# Patient Record
Sex: FEMALE | Race: WHITE | NOT HISPANIC OR LATINO | Employment: PART TIME | URBAN - METROPOLITAN AREA
[De-identification: names, ages, dates, MRNs, and addresses within clinical notes are randomized per-mention and may not be internally consistent; named-entity substitution may affect disease eponyms.]

---

## 2023-06-26 ENCOUNTER — APPOINTMENT (OUTPATIENT)
Dept: PRIMARY CARE | Facility: CLINIC | Age: 38
End: 2023-06-26
Payer: COMMERCIAL

## 2023-08-30 LAB
ALANINE AMINOTRANSFERASE (SGPT) (U/L) IN SER/PLAS: 14 U/L (ref 7–45)
ALBUMIN (G/DL) IN SER/PLAS: 4.3 G/DL (ref 3.4–5)
ALKALINE PHOSPHATASE (U/L) IN SER/PLAS: 92 U/L (ref 33–110)
ANION GAP IN SER/PLAS: 13 MMOL/L (ref 10–20)
ASPARTATE AMINOTRANSFERASE (SGOT) (U/L) IN SER/PLAS: 21 U/L (ref 9–39)
BASOPHILS (10*3/UL) IN BLOOD BY AUTOMATED COUNT: 0.05 X10E9/L (ref 0–0.1)
BASOPHILS/100 LEUKOCYTES IN BLOOD BY AUTOMATED COUNT: 1 % (ref 0–2)
BILIRUBIN TOTAL (MG/DL) IN SER/PLAS: 0.2 MG/DL (ref 0–1.2)
CALCIDIOL (25 OH VITAMIN D3) (NG/ML) IN SER/PLAS: 32 NG/ML
CALCIUM (MG/DL) IN SER/PLAS: 9.2 MG/DL (ref 8.6–10.3)
CARBON DIOXIDE, TOTAL (MMOL/L) IN SER/PLAS: 27 MMOL/L (ref 21–32)
CHLORIDE (MMOL/L) IN SER/PLAS: 104 MMOL/L (ref 98–107)
CHOLESTEROL (MG/DL) IN SER/PLAS: 219 MG/DL (ref 0–199)
CHOLESTEROL IN HDL (MG/DL) IN SER/PLAS: 80.2 MG/DL
CHOLESTEROL/HDL RATIO: 2.7
COBALAMIN (VITAMIN B12) (PG/ML) IN SER/PLAS: 156 PG/ML (ref 211–911)
CREATININE (MG/DL) IN SER/PLAS: 0.74 MG/DL (ref 0.5–1.05)
EOSINOPHILS (10*3/UL) IN BLOOD BY AUTOMATED COUNT: 0.1 X10E9/L (ref 0–0.7)
EOSINOPHILS/100 LEUKOCYTES IN BLOOD BY AUTOMATED COUNT: 2 % (ref 0–6)
ERYTHROCYTE DISTRIBUTION WIDTH (RATIO) BY AUTOMATED COUNT: 13.1 % (ref 11.5–14.5)
ERYTHROCYTE MEAN CORPUSCULAR HEMOGLOBIN CONCENTRATION (G/DL) BY AUTOMATED: 32.8 G/DL (ref 32–36)
ERYTHROCYTE MEAN CORPUSCULAR VOLUME (FL) BY AUTOMATED COUNT: 95 FL (ref 80–100)
ERYTHROCYTES (10*6/UL) IN BLOOD BY AUTOMATED COUNT: 4.35 X10E12/L (ref 4–5.2)
GFR FEMALE: >90 ML/MIN/1.73M2
GLUCOSE (MG/DL) IN SER/PLAS: 67 MG/DL (ref 74–99)
HEMATOCRIT (%) IN BLOOD BY AUTOMATED COUNT: 41.5 % (ref 36–46)
HEMOGLOBIN (G/DL) IN BLOOD: 13.6 G/DL (ref 12–16)
IMMATURE GRANULOCYTES/100 LEUKOCYTES IN BLOOD BY AUTOMATED COUNT: 0.2 % (ref 0–0.9)
LDL: 124 MG/DL (ref 0–99)
LEUKOCYTES (10*3/UL) IN BLOOD BY AUTOMATED COUNT: 5 X10E9/L (ref 4.4–11.3)
LYMPHOCYTES (10*3/UL) IN BLOOD BY AUTOMATED COUNT: 1.08 X10E9/L (ref 1.2–4.8)
LYMPHOCYTES/100 LEUKOCYTES IN BLOOD BY AUTOMATED COUNT: 21.6 % (ref 13–44)
MONOCYTES (10*3/UL) IN BLOOD BY AUTOMATED COUNT: 0.4 X10E9/L (ref 0.1–1)
MONOCYTES/100 LEUKOCYTES IN BLOOD BY AUTOMATED COUNT: 8 % (ref 2–10)
NEUTROPHILS (10*3/UL) IN BLOOD BY AUTOMATED COUNT: 3.35 X10E9/L (ref 1.2–7.7)
NEUTROPHILS/100 LEUKOCYTES IN BLOOD BY AUTOMATED COUNT: 67.2 % (ref 40–80)
PLATELETS (10*3/UL) IN BLOOD AUTOMATED COUNT: 231 X10E9/L (ref 150–450)
POTASSIUM (MMOL/L) IN SER/PLAS: 4.2 MMOL/L (ref 3.5–5.3)
PROTEIN TOTAL: 7.1 G/DL (ref 6.4–8.2)
SODIUM (MMOL/L) IN SER/PLAS: 140 MMOL/L (ref 136–145)
THYROTROPIN (MIU/L) IN SER/PLAS BY DETECTION LIMIT <= 0.05 MIU/L: 2.01 MIU/L (ref 0.44–3.98)
TRIGLYCERIDE (MG/DL) IN SER/PLAS: 76 MG/DL (ref 0–149)
UREA NITROGEN (MG/DL) IN SER/PLAS: 18 MG/DL (ref 6–23)
VLDL: 15 MG/DL (ref 0–40)

## 2023-10-02 ENCOUNTER — APPOINTMENT (OUTPATIENT)
Dept: PHYSICAL THERAPY | Facility: CLINIC | Age: 38
End: 2023-10-02
Payer: COMMERCIAL

## 2023-10-02 ENCOUNTER — DOCUMENTATION (OUTPATIENT)
Dept: PHYSICAL THERAPY | Facility: CLINIC | Age: 38
End: 2023-10-02
Payer: COMMERCIAL

## 2023-10-04 PROBLEM — R13.10 DYSPHAGIA: Status: ACTIVE | Noted: 2023-10-04

## 2023-10-04 PROBLEM — B36.0 TINEA VERSICOLOR: Status: ACTIVE | Noted: 2023-10-04

## 2023-10-04 PROBLEM — G56.00 CARPAL TUNNEL SYNDROME: Status: ACTIVE | Noted: 2023-10-04

## 2023-10-04 PROBLEM — E55.9 VITAMIN D DEFICIENCY: Status: ACTIVE | Noted: 2023-10-04

## 2023-10-04 PROBLEM — I83.893 VARICOSE VEINS OF BILATERAL LOWER EXTREMITIES WITH OTHER COMPLICATIONS: Status: ACTIVE | Noted: 2023-10-04

## 2023-10-04 PROBLEM — M62.81 MUSCULAR WEAKNESS: Status: ACTIVE | Noted: 2023-10-04

## 2023-10-04 PROBLEM — H60.90 OTITIS EXTERNA: Status: ACTIVE | Noted: 2023-10-04

## 2023-10-04 PROBLEM — F41.8 DEPRESSION WITH ANXIETY: Status: ACTIVE | Noted: 2023-10-04

## 2023-10-04 PROBLEM — R22.0 SCALP MASS: Status: ACTIVE | Noted: 2023-10-04

## 2023-10-04 PROBLEM — G47.00 INSOMNIA: Status: ACTIVE | Noted: 2023-10-04

## 2023-10-04 RX ORDER — OMEPRAZOLE 40 MG/1
1 CAPSULE, DELAYED RELEASE ORAL 2 TIMES DAILY
COMMUNITY
Start: 2022-06-20

## 2023-10-04 RX ORDER — LANOLIN ALCOHOL/MO/W.PET/CERES
1000 CREAM (GRAM) TOPICAL DAILY
COMMUNITY
Start: 2023-08-31 | End: 2024-03-07 | Stop reason: SDUPTHER

## 2023-10-04 RX ORDER — IBUPROFEN 600 MG/1
600 TABLET ORAL EVERY 6 HOURS PRN
COMMUNITY
Start: 2023-04-08

## 2023-10-04 RX ORDER — HYDROXYZINE HYDROCHLORIDE 25 MG/1
1 TABLET, FILM COATED ORAL 2 TIMES DAILY PRN
COMMUNITY
Start: 2022-05-31 | End: 2024-01-25 | Stop reason: WASHOUT

## 2023-10-04 RX ORDER — ASPIRIN 325 MG
TABLET, DELAYED RELEASE (ENTERIC COATED) ORAL
COMMUNITY

## 2023-10-04 RX ORDER — SIMETHICONE 125 MG
1 CAPSULE ORAL 4 TIMES DAILY PRN
COMMUNITY
Start: 2022-06-20

## 2023-10-04 RX ORDER — ACETAMINOPHEN 500 MG
1 TABLET ORAL DAILY
COMMUNITY
Start: 2022-03-02 | End: 2024-01-25 | Stop reason: WASHOUT

## 2023-10-04 RX ORDER — AMOXICILLIN 250 MG
2 CAPSULE ORAL DAILY
COMMUNITY
Start: 2022-06-20

## 2023-10-04 RX ORDER — DOCUSATE SODIUM 100 MG/1
2 CAPSULE, LIQUID FILLED ORAL NIGHTLY
COMMUNITY
Start: 2023-04-08

## 2023-10-04 RX ORDER — MULTIVITAMIN
1 TABLET ORAL DAILY
COMMUNITY

## 2023-10-04 RX ORDER — PANTOPRAZOLE SODIUM 40 MG/1
1 TABLET, DELAYED RELEASE ORAL DAILY
COMMUNITY

## 2023-10-04 RX ORDER — FAMOTIDINE 20 MG/1
20 TABLET, FILM COATED ORAL 2 TIMES DAILY
COMMUNITY
Start: 2023-03-03

## 2023-10-04 RX ORDER — DULOXETIN HYDROCHLORIDE 30 MG/1
30 CAPSULE, DELAYED RELEASE ORAL DAILY
COMMUNITY
Start: 2023-08-22 | End: 2024-01-25 | Stop reason: WASHOUT

## 2023-10-05 ENCOUNTER — TREATMENT (OUTPATIENT)
Dept: PHYSICAL THERAPY | Facility: CLINIC | Age: 38
End: 2023-10-05
Payer: COMMERCIAL

## 2023-10-05 DIAGNOSIS — M54.50 LOW BACK PAIN, UNSPECIFIED: Primary | ICD-10-CM

## 2023-10-05 DIAGNOSIS — M62.81 MUSCULAR WEAKNESS: ICD-10-CM

## 2023-10-05 PROCEDURE — 97110 THERAPEUTIC EXERCISES: CPT | Mod: GP,CQ

## 2023-10-05 PROCEDURE — 97140 MANUAL THERAPY 1/> REGIONS: CPT | Mod: GP,CQ

## 2023-10-05 ASSESSMENT — PAIN - FUNCTIONAL ASSESSMENT: PAIN_FUNCTIONAL_ASSESSMENT: 0-10

## 2023-10-05 ASSESSMENT — PAIN SCALES - GENERAL: PAINLEVEL_OUTOF10: 4

## 2023-10-06 PROBLEM — M54.50 LOW BACK PAIN: Status: ACTIVE | Noted: 2023-10-06

## 2023-10-06 NOTE — PROGRESS NOTES
Physical Therapy    Physical Therapy Treatment    Patient Name: Tiffanie Pérez  MRN: [id number]  Today's Date: 10/6/2023  Time Calculation  Start Time: 1030  Stop Time: 1115  Time Calculation (min): 45 min      Assessment:  Pt demos improved TA activation. Able to improve pelvic alignment post MTT .     Plan:  Continue to improve tissue quality .    Current Problem  1. Low back pain, unspecified  PT eval and treat      2. Muscular weakness            Subjective   General  States that  she is paying more attention to posture . Still pay with active trunk flexion.   Discomfort R LB      Pain  Pain Assessment: 0-10  Pain Score: 4    Objective   ASIS R high   PSIS level    Treatments:  There EX 20 minutes  Nustep level 3 5 minutes   Standing Hip flexor stretch 2 x30 seconds   Supine TA static   TA BKFO x10   Bridges x10   MTT:  25 minutes   B Hip flexor release   Iliacus release B   STM along sacral border / R Lumbar paraspinals/R  glute / R Piriformis         Goals:   Goals: Goals set and discussed today.     Goals: To be achieved in 15 visits    Patient will verbalize a decrease in pain in Low back to <2/10 after walking 45 min    Patient will improve flexibility, joint mobility, and AROM - Patient to not have TrP or be TTP in L psoas. L Low back     Patient will increase Core and L hip strength to G - 5/5 to allow the patient to perform all household activities and to be able to carry her baby without pain    Patient will improve IVY outcome measure score to < 24% to demonstrate an overall improvement in function and so patient can ambulate at least 60 min and put her baby in and out of her car.    Patient will correctly perform HEP without cues to maintain progress and overall functional status and patient will be independent with strategies designed to manage symptoms     Patient's LTG - Patients goals: Less pain more mobility

## 2023-10-10 ENCOUNTER — DOCUMENTATION (OUTPATIENT)
Dept: PHYSICAL THERAPY | Facility: CLINIC | Age: 38
End: 2023-10-10
Payer: COMMERCIAL

## 2023-10-10 NOTE — PROGRESS NOTES
Physical Therapy                 Therapy Communication Note    Patient Name: Tiffanie Pérez  MRN: [id number]  Today's Date: 10/10/2023     Discipline: Physical Therapy    Missed Visit Reason:   Unknown    Missed Time: No Show    Comment:

## 2023-10-13 ENCOUNTER — TREATMENT (OUTPATIENT)
Dept: PHYSICAL THERAPY | Facility: CLINIC | Age: 38
End: 2023-10-13
Payer: COMMERCIAL

## 2023-10-13 DIAGNOSIS — M62.81 MUSCULAR WEAKNESS: Primary | ICD-10-CM

## 2023-10-13 DIAGNOSIS — M54.50 LOW BACK PAIN, UNSPECIFIED: ICD-10-CM

## 2023-10-13 PROCEDURE — 97140 MANUAL THERAPY 1/> REGIONS: CPT | Mod: GP | Performed by: PHYSICAL THERAPIST

## 2023-10-13 PROCEDURE — 97110 THERAPEUTIC EXERCISES: CPT | Mod: GP | Performed by: PHYSICAL THERAPIST

## 2023-10-13 ASSESSMENT — PAIN SCALES - GENERAL: PAINLEVEL_OUTOF10: 3

## 2023-10-13 ASSESSMENT — PAIN - FUNCTIONAL ASSESSMENT: PAIN_FUNCTIONAL_ASSESSMENT: 0-10

## 2023-10-13 NOTE — PROGRESS NOTES
Physical Therapy    Physical Therapy Treatment    Patient Name: Tiffanie Pérez  MRN: [id number]  Today's Date: 10/13/2023  Time Calculation  Start Time: 0915  Stop Time: 0955  Time Calculation (min): 40 min      Assessment:   Patient notes improved pain levels resultant from activities in therapy session. Improved tissue quality noted as well as body mechanics demonstrated after cueing and corrections. Tightness and stiffness remain in R SI joint.  Patient continues to require active therapy to progress functional capabilities with decreasing pain levels and improving mechanics with functional activities including progression of Home exercise program. Tolerance to today's treatment was good. Muscle fatigue noted.  Patient appears motivated and compliant this date, demonstrating a working understanding of principals instructed and home program    Plan:   Progress with functional strength as tolerated with respect to tissue healing. Manual therapy PRN to improve/maintain ROM, prevent adhesion, reduce pain.       Current Problem  1. Muscular weakness        2. Low back pain, unspecified  PT eval and treat          Subjective  States that overall symptoms ar improved.  Cont to have difficulty with trunk flexoin when performing tasks.   Stated that pain used to start in the morning and be all day.  Now pain starts around 3:00pm  General  Insurance reviewed   Visit number: 5/15   Approved number of visits: 29, approved dates: 9.13.23 - 12.31.23  Authorization required after evaluation   [employer] - current auth 1   PA required -        Precautions  no falls, low risk      Pain  Pain Assessment: 0-10  Pain Score: 3  Pain Type: Chronic pain  Pain Location: Hip  Effect of Pain on Daily Activities: Forward flexion - bending up to  her baby increases pain    Objective     Tightness noted in R SI joint   Ongoing tightness and TTP B psoas      Outcome Measures:  Other Measures  Oswestry Disablity Index (IVY): 24% at  time of eval    Treatments:  There EX  Nustep level 3 5 minutes   Standing Hip flexor stretch 2 x30 seconds   Supine TA static Home  TA BKFO x10   Bridges x10   Bridges with hip ER GTB x 10  Bridges with ball squeeze x 10  Supine hip add/IR with ball and B LE extended  10    Manual 53666:  Prone sacral mobs   B Hip flexor release   Iliacus release B   STM along sacral border / L Lumbar paraspinals/L  glute / L Piriformis        OP EDUCATION:   To become more aware of need for TA stability during the day with functional tasks.  Reviewed lifting techniques.    Goals:

## 2023-10-17 ENCOUNTER — DOCUMENTATION (OUTPATIENT)
Dept: PHYSICAL THERAPY | Facility: CLINIC | Age: 38
End: 2023-10-17
Payer: COMMERCIAL

## 2023-10-17 NOTE — PROGRESS NOTES
Physical Therapy                 Therapy Communication Note    Patient Name: Tiffanie Pérez  MRN: [id number]  Today's Date: 10/17/2023     Discipline: Physical Therapy    Missed Visit Reason:      Missed Time: No Show    Comment:   Possible error on our part.  I believe patient told PT she could not attend this appt.

## 2023-10-19 PROBLEM — E53.8 VITAMIN B12 DEFICIENCY: Status: ACTIVE | Noted: 2023-10-19

## 2023-10-19 PROBLEM — E78.5 HYPERLIPIDEMIA: Status: ACTIVE | Noted: 2023-10-19

## 2023-10-20 ENCOUNTER — DOCUMENTATION (OUTPATIENT)
Dept: PHYSICAL THERAPY | Facility: CLINIC | Age: 38
End: 2023-10-20
Payer: COMMERCIAL

## 2023-10-20 NOTE — PROGRESS NOTES
Physical Therapy                 Therapy Communication Note    Patient Name: Tiffanie Pérez  MRN: [id number]  Today's Date: 10/20/2023     Discipline: Physical Therapy    Missed Visit Reason:      Missed Time: No Show    Comment: VM left for patient to confirm next appt.

## 2023-10-24 ENCOUNTER — DOCUMENTATION (OUTPATIENT)
Dept: PHYSICAL THERAPY | Facility: CLINIC | Age: 38
End: 2023-10-24
Payer: COMMERCIAL

## 2023-10-24 NOTE — PROGRESS NOTES
Physical Therapy                 Therapy Communication Note    Patient Name: Tiffanie Pérez  MRN: [id number]  Today's Date: 10/24/2023     Discipline: Physical Therapy    Missed Visit Reason:      Missed Time: No Show    Comment:

## 2023-10-25 NOTE — PROGRESS NOTES
Patient Name: Tiffanie Pérez  MRN: [id number]  Today's Date: 10/25/2023        Diagnosis:  No diagnosis found.    Insurance:  Insurance reviewed   Visit number: 5/20   Approved number of visits: 68   $25 copay 75/year - used 6 in 2023       Assessment:  Tolerance to today's treatment was good with noted muscular fatigue.  Patient notes improved pain levels resultant from activities in therapy session. Improved tissue quality and strength noted by ***.  Cont to require skilled PT to cont with decreasing pain levels by improving mechanics, strength, decreasing STR and progression of HEP in order to achieve the patient's and PT goals. Patient appears motivated and compliant this date and demonstrates a working understanding of principals instructed and home program.    Plan:  Progress with functional strength as tolerated with respect to tissue healing. Manual therapy PRN to improve/maintain ROM, prevent adhesion, reduce pain.    Subjective:  ***    Pain:      Location: ***   Description: ***   Aggravating Factors: {Aggravating Factors:77594}   Relieving Factors:  {Relieving Factors:63371}    Precautions:  Precautions: ***       Objective:  ***    Outcome Measure:  {PT Outcome Measures:58234}     Treatment:  Treatments:  There EX 20 minutes  Nustep level 3 5 minutes   Standing Hip flexor stretch 2 x30 seconds   Supine TA static   TA BKFO x10   Bridges x10   MTT:  25 minutes   B Hip flexor release   Iliacus release B   STM along sacral border / R Lumbar paraspinals/R  glute / R Piriformis        HEP/Education:  ***

## 2023-10-26 ENCOUNTER — DOCUMENTATION (OUTPATIENT)
Dept: PHYSICAL THERAPY | Facility: CLINIC | Age: 38
End: 2023-10-26
Payer: COMMERCIAL

## 2023-10-26 ENCOUNTER — APPOINTMENT (OUTPATIENT)
Dept: PHYSICAL THERAPY | Facility: CLINIC | Age: 38
End: 2023-10-26
Payer: COMMERCIAL

## 2023-10-26 NOTE — PROGRESS NOTES
Physical Therapy                 Therapy Communication Note    Patient Name: Tiffanie Pérez  MRN: [id number]  Today's Date: 10/26/2023     Discipline: Physical Therapy    Missed Visit Reason:      Missed Time: Cancel    Comment: Cancelled through [facility]

## 2023-10-31 ENCOUNTER — DOCUMENTATION (OUTPATIENT)
Dept: PHYSICAL THERAPY | Facility: CLINIC | Age: 38
End: 2023-10-31
Payer: COMMERCIAL

## 2023-10-31 ENCOUNTER — APPOINTMENT (OUTPATIENT)
Dept: PHYSICAL THERAPY | Facility: CLINIC | Age: 38
End: 2023-10-31
Payer: COMMERCIAL

## 2023-10-31 NOTE — PROGRESS NOTES
Physical Therapy                 Therapy Communication Note    Patient Name: Tiffanie Pérez  MRN: [id number]  Today's Date: 10/31/2023     Discipline: Physical Therapy    Missed Visit Reason:      Missed Time: Cancel    Comment: no reason given

## 2023-11-01 ENCOUNTER — DOCUMENTATION (OUTPATIENT)
Dept: PHYSICAL THERAPY | Facility: CLINIC | Age: 38
End: 2023-11-01
Payer: COMMERCIAL

## 2023-11-01 NOTE — PROGRESS NOTES
Physical Therapy                 Therapy Communication Note    Patient Name: Tiffanie Pérez  MRN: [id number]  Today's Date: 11/1/2023     Discipline: Physical Therapy    Missed Visit Reason:   cancelled through  automatic reminder    Missed Time: Cancel    Comment: Last scheduled appointment - Will DC.

## 2023-11-02 ENCOUNTER — DOCUMENTATION (OUTPATIENT)
Dept: PHYSICAL THERAPY | Facility: CLINIC | Age: 38
End: 2023-11-02
Payer: COMMERCIAL

## 2023-11-02 NOTE — PROGRESS NOTES
Physical Therapy    Discharge Summary    Name: Tiffanie Pérez  MRN: [id number]  : 1985  Date: 23    Discharge Summary: PT    Discharge Information: Date of discharge 23, Date of last visit 10.13.23, Date of evaluation 23, Number of attended visits 5, Referred by Elizabeth Quintero, and Referred for LBP    Therapy Summary: Patient seen for PT for the following problems  Ms. Pérez's clinical presentation includes characteristics as noted during today’s evaluation consistent with referring diagnosis of LBP L most likely due to pelvic obliquities, muscle weakness and core stability, and joint hyperlaxity from pregnancy. Patient presents with the following deficits this date:    Pain in L LB   Decreased ROM Hip IR  Decreased Strength Hip/Core  Decreased Joint mobility/flexibility L psoas TR  Decreased Motor control and functional limitations IVY 24%, limitations in standing, sitting, and walking tolerance    Tenderness to palpation - TTP L psoas, L PSIS, progressing proximally along L paraspinals, QL. Reported L back discomfort with L psoas release    Discharge Status: Patient only completed 5 of her 15 visits and cancelled the remaining visits.  Unable to reassess     Rehab Discharge Reason: Failed to schedule and/or keep follow-up appointment(s)

## 2023-11-03 ENCOUNTER — APPOINTMENT (OUTPATIENT)
Dept: PHYSICAL THERAPY | Facility: CLINIC | Age: 38
End: 2023-11-03
Payer: COMMERCIAL

## 2024-01-25 ENCOUNTER — OFFICE VISIT (OUTPATIENT)
Dept: PRIMARY CARE | Facility: CLINIC | Age: 39
End: 2024-01-25
Payer: COMMERCIAL

## 2024-01-25 VITALS
HEIGHT: 68 IN | HEART RATE: 75 BPM | WEIGHT: 211.6 LBS | DIASTOLIC BLOOD PRESSURE: 84 MMHG | TEMPERATURE: 98.2 F | BODY MASS INDEX: 32.07 KG/M2 | SYSTOLIC BLOOD PRESSURE: 119 MMHG

## 2024-01-25 DIAGNOSIS — E66.09 CLASS 1 OBESITY DUE TO EXCESS CALORIES WITHOUT SERIOUS COMORBIDITY WITH BODY MASS INDEX (BMI) OF 32.0 TO 32.9 IN ADULT: ICD-10-CM

## 2024-01-25 DIAGNOSIS — L21.9 SEBORRHEIC DERMATITIS OF SCALP: ICD-10-CM

## 2024-01-25 DIAGNOSIS — R53.83 FATIGUE, UNSPECIFIED TYPE: ICD-10-CM

## 2024-01-25 DIAGNOSIS — F41.8 DEPRESSION WITH ANXIETY: Primary | ICD-10-CM

## 2024-01-25 PROBLEM — E66.811 CLASS 1 OBESITY DUE TO EXCESS CALORIES WITHOUT SERIOUS COMORBIDITY WITH BODY MASS INDEX (BMI) OF 32.0 TO 32.9 IN ADULT: Status: ACTIVE | Noted: 2024-01-25

## 2024-01-25 PROCEDURE — 1036F TOBACCO NON-USER: CPT | Performed by: FAMILY MEDICINE

## 2024-01-25 PROCEDURE — 3008F BODY MASS INDEX DOCD: CPT | Performed by: FAMILY MEDICINE

## 2024-01-25 PROCEDURE — 99214 OFFICE O/P EST MOD 30 MIN: CPT | Performed by: FAMILY MEDICINE

## 2024-01-25 RX ORDER — KETOCONAZOLE 20 MG/ML
SHAMPOO, SUSPENSION TOPICAL 2 TIMES WEEKLY
Qty: 120 ML | Refills: 0 | Status: SHIPPED | OUTPATIENT
Start: 2024-01-25 | End: 2024-02-22

## 2024-01-25 RX ORDER — ESCITALOPRAM OXALATE 5 MG/1
5 TABLET ORAL DAILY
Qty: 90 TABLET | Refills: 0 | Status: SHIPPED | OUTPATIENT
Start: 2024-01-25 | End: 2024-03-07

## 2024-01-25 NOTE — PROGRESS NOTES
Subjective   Patient ID: Tiffanie Pérez is a 38 y.o. female who presents for Follow-up: She reports that the Cymbalta was not helpful.  She states that she is still feeling anxious and down at times.  She would like to try another medication.  She reports that her mom who has been more issues with depression has been on Celexa and her sister who has both anxiety and depression has been on Lexapro and done well with this.  She is interested in trying the Lexapro.  She states that she does see a counselor and feels that this can be stressful but is continue to work with that.  She states that she is also having difficulty losing weight.  She reports that she has been eating healthy and exercising and feels like she is gaining weight.  She is possibly interested in trying a medication she has been on phentermine in the past.  She also reports a rash that she gets on the back of her head.  She has used a cream in the past that has a steroid, antifungal and antibiotic and it that has been helpful.  She states that she gets red scaly patches.  She denies any rash elsewhere.  She denies any chest pain or shortness of breath or abdominal pain.  She denies any other concerns.     Past Surgical History:   Procedure Laterality Date    OTHER SURGICAL HISTORY  03/01/2022    Cholecystectomy    OTHER SURGICAL HISTORY  03/01/2022    Appendectomy      Family History   Problem Relation Name Age of Onset    Mental illness Mother      Multiple sclerosis Mother      Cirrhosis Father      Pancreatic cancer Paternal Grandmother        Social History     Socioeconomic History    Marital status: Single     Spouse name: Not on file    Number of children: Not on file    Years of education: Not on file    Highest education level: Not on file   Occupational History    Not on file   Tobacco Use    Smoking status: Never    Smokeless tobacco: Never   Substance and Sexual Activity    Alcohol use: Yes    Drug use: Not Currently    Sexual  activity: Not on file   Other Topics Concern    Not on file   Social History Narrative    Not on file     Social Determinants of Health     Financial Resource Strain: Not on file   Food Insecurity: Not on file   Transportation Needs: Not on file   Physical Activity: Not on file   Stress: Not on file   Social Connections: Not on file   Intimate Partner Violence: Not on file   Housing Stability: Not on file      Patient has no known allergies.   Current Outpatient Medications   Medication Sig Dispense Refill    cholecalciferol (Vitamin D-3) 1,250 mcg (50,000 unit) capsule Take by mouth 1 (one) time per week.      copper (Paragard) 380 square mm IUD 1 each by intrauterine route.      cyanocobalamin (Vitamin B-12) 1,000 mcg tablet Take 1 tablet (1,000 mcg) by mouth once daily. as directed      multivitamin tablet Take 1 tablet by mouth once daily.      CALCIUM CARBONATE ORAL Take by mouth.      docusate sodium (Colace) 100 mg capsule Take 2 capsules (200 mg) by mouth once daily at bedtime.      escitalopram (Lexapro) 5 mg tablet Take 1 tablet (5 mg) by mouth once daily. 90 tablet 0    famotidine (Pepcid) 20 mg tablet Take 1 tablet (20 mg) by mouth 2 times a day.      hydrOXYzine HCL (Atarax) 25 mg tablet Take 1 tablet (25 mg) by mouth 2 times a day as needed for anxiety.      ibuprofen 600 mg tablet Take 1 tablet (600 mg) by mouth every 6 hours if needed.      ketoconazole (NIZOral) 2 % shampoo Apply topically 2 times a week. Shampoo daily, leave on for 5-10 minutes, then rinse. 120 mL 0    omeprazole (PriLOSEC) 40 mg DR capsule Take 1 capsule (40 mg) by mouth 2 times a day.      pantoprazole (ProtoNix) 40 mg EC tablet Take 1 tablet (40 mg) by mouth once daily.      sennosides-docusate sodium (Lashanda-Colace) 8.6-50 mg tablet Take 2 tablets by mouth once daily.      simethicone (Mylicon,Gas-X) 125 mg capsule Take 1 capsule (125 mg) by mouth 4 times a day as needed.       No current facility-administered medications for  this visit.       Immunization History   Administered Date(s) Administered    Flu vaccine (IIV4), preservative free *Check age/dose* 10/26/2020    Influenza, Unspecified 10/14/2013    Influenza, injectable, quadrivalent 09/26/2022    Influenza, seasonal, injectable 10/26/2020    Moderna SARS-CoV-2 Vaccination 04/13/2022    Pfizer Purple Cap SARS-CoV-2 09/01/2021, 09/23/2021    Tdap vaccine, age 7 year and older (BOOSTRIX) 06/11/2013, 01/19/2023        Review of Systems     Vitals:    01/25/24 1135   BP: 119/84   Pulse: 75   Temp: 36.8 °C (98.2 °F)       Physical Exam  Vitals reviewed.   HENT:      Head: Normocephalic and atraumatic.      Right Ear: Tympanic membrane normal.      Left Ear: Tympanic membrane normal.      Nose: Nose normal.   Eyes:      Extraocular Movements: Extraocular movements intact.      Conjunctiva/sclera: Conjunctivae normal.      Pupils: Pupils are equal, round, and reactive to light.   Cardiovascular:      Rate and Rhythm: Normal rate and regular rhythm.      Pulses: Normal pulses.   Pulmonary:      Effort: Pulmonary effort is normal.      Breath sounds: Normal breath sounds.   Abdominal:      General: There is no distension.      Palpations: Abdomen is soft.      Tenderness: There is no abdominal tenderness.   Musculoskeletal:         General: Normal range of motion.      Cervical back: Normal range of motion and neck supple.      Right lower leg: No edema.      Left lower leg: No edema.   Lymphadenopathy:      Cervical: No cervical adenopathy.   Skin:     Comments: Few erythematous scaly patches about 3 to 4 mm in size at the base of the scalp.   Neurological:      General: No focal deficit present.      Mental Status: She is alert.          @labresults@    Assessment/Plan     Problem List Items Addressed This Visit       Depression with anxiety - Primary    Current Assessment & Plan     Patient had side effects with Cymbalta and did not feel that it was helpful.  Will start her on Lexapro  5 mg daily.  Continue with counseling.  Follow-up in 6 weeks to reevaluate.         Relevant Medications    escitalopram (Lexapro) 5 mg tablet    Class 1 obesity due to excess calories without serious comorbidity with body mass index (BMI) of 32.0 to 32.9 in adult    Current Assessment & Plan     Patient referred for nutrition services and integrative medicine to discuss weight loss.         Relevant Orders    Referral to Nutrition Services    Referral to [facility]    Seborrheic dermatitis of scalp    Current Assessment & Plan     Will treat with ketoconazole shampoo.  Also patient referred to dermatology for further evaluation.         Relevant Medications    ketoconazole (NIZOral) 2 % shampoo    Other Relevant Orders    Referral to Dermatology    Fatigue    Current Assessment & Plan     Check blood work as ordered.         Relevant Orders    Vitamin B12    Tsh With Reflex To Free T4 If Abnormal    CBC and Auto Differential    Comprehensive Metabolic Panel

## 2024-01-25 NOTE — ASSESSMENT & PLAN NOTE
Patient had side effects with Cymbalta and did not feel that it was helpful.  Will start her on Lexapro 5 mg daily.  Continue with counseling.  Follow-up in 6 weeks to reevaluate.

## 2024-01-25 NOTE — ASSESSMENT & PLAN NOTE
Will treat with ketoconazole shampoo.  Also patient referred to dermatology for further evaluation.

## 2024-02-22 DIAGNOSIS — L21.9 SEBORRHEIC DERMATITIS OF SCALP: ICD-10-CM

## 2024-02-22 RX ORDER — KETOCONAZOLE 20 MG/ML
SHAMPOO, SUSPENSION TOPICAL 2 TIMES WEEKLY
Qty: 120 ML | Refills: 3 | Status: SHIPPED | OUTPATIENT
Start: 2024-02-22

## 2024-02-28 PROBLEM — T83.32XA DISPLACEMENT OF INTRAUTERINE CONTRACEPTIVE DEVICE: Status: RESOLVED | Noted: 2023-06-30 | Resolved: 2024-02-28

## 2024-03-07 ENCOUNTER — OFFICE VISIT (OUTPATIENT)
Dept: PRIMARY CARE | Facility: CLINIC | Age: 39
End: 2024-03-07
Payer: COMMERCIAL

## 2024-03-07 VITALS
TEMPERATURE: 98.2 F | HEIGHT: 68 IN | DIASTOLIC BLOOD PRESSURE: 80 MMHG | HEART RATE: 71 BPM | WEIGHT: 210.2 LBS | BODY MASS INDEX: 31.86 KG/M2 | SYSTOLIC BLOOD PRESSURE: 118 MMHG

## 2024-03-07 DIAGNOSIS — F41.8 DEPRESSION WITH ANXIETY: Primary | ICD-10-CM

## 2024-03-07 DIAGNOSIS — E66.09 CLASS 1 OBESITY DUE TO EXCESS CALORIES WITH SERIOUS COMORBIDITY AND BODY MASS INDEX (BMI) OF 31.0 TO 31.9 IN ADULT: ICD-10-CM

## 2024-03-07 DIAGNOSIS — E53.8 VITAMIN B12 DEFICIENCY: ICD-10-CM

## 2024-03-07 PROBLEM — E66.811 CLASS 1 OBESITY DUE TO EXCESS CALORIES WITH SERIOUS COMORBIDITY AND BODY MASS INDEX (BMI) OF 31.0 TO 31.9 IN ADULT: Status: ACTIVE | Noted: 2024-03-07

## 2024-03-07 PROCEDURE — 99214 OFFICE O/P EST MOD 30 MIN: CPT | Performed by: FAMILY MEDICINE

## 2024-03-07 PROCEDURE — 3008F BODY MASS INDEX DOCD: CPT | Performed by: FAMILY MEDICINE

## 2024-03-07 PROCEDURE — 1036F TOBACCO NON-USER: CPT | Performed by: FAMILY MEDICINE

## 2024-03-07 RX ORDER — ESCITALOPRAM OXALATE 10 MG/1
10 TABLET ORAL DAILY
Qty: 90 TABLET | Refills: 0 | Status: SHIPPED | OUTPATIENT
Start: 2024-03-07 | End: 2024-06-05

## 2024-03-07 RX ORDER — LANOLIN ALCOHOL/MO/W.PET/CERES
1000 CREAM (GRAM) TOPICAL DAILY
Qty: 90 TABLET | Refills: 1 | Status: SHIPPED | OUTPATIENT
Start: 2024-03-07

## 2024-03-07 NOTE — ASSESSMENT & PLAN NOTE
Symptoms improving but not controlled.  Will increase Lexapro to 10 mg daily.  Follow-up in 1 to 2 months to reevaluate or sooner with any concerns

## 2024-03-07 NOTE — PROGRESS NOTES
Subjective   Patient ID: Tiffanie Pérez is a 39 y.o. female who presents for Follow-up (6 weeks follow up on new medication.  ):  She was started on Lexapro 5 mg at our last appointment.  She states that she feels like it is helping.  She states that she feels less irritable and anxious.  She has not noticed any side effects and would like to increase the dose.  She states that otherwise she has been doing well other than just a lot going on.  She denies any chest pain or shortness of breath or abdominal pain.  She has not had her blood work done yet.  She is requesting a refill on her B12.  She denies any other concerns.     Past Surgical History:   Procedure Laterality Date    OTHER SURGICAL HISTORY  03/01/2022    Cholecystectomy    OTHER SURGICAL HISTORY  03/01/2022    Appendectomy      Family History   Problem Relation Name Age of Onset    Mental illness Mother      Multiple sclerosis Mother      Cirrhosis Father      Pancreatic cancer Paternal Grandmother        Social History     Socioeconomic History    Marital status: Single     Spouse name: Not on file    Number of children: Not on file    Years of education: Not on file    Highest education level: Not on file   Occupational History    Not on file   Tobacco Use    Smoking status: Never    Smokeless tobacco: Never   Substance and Sexual Activity    Alcohol use: Yes    Drug use: Not Currently    Sexual activity: Not on file   Other Topics Concern    Not on file   Social History Narrative    Not on file     Social Determinants of Health     Financial Resource Strain: Not on file   Food Insecurity: Not on file   Transportation Needs: Not on file   Physical Activity: Not on file   Stress: Not on file   Social Connections: Not on file   Intimate Partner Violence: Not on file   Housing Stability: Not on file      Patient has no known allergies.   Current Outpatient Medications   Medication Sig Dispense Refill    CALCIUM CARBONATE ORAL Take by mouth.       cholecalciferol (Vitamin D-3) 1,250 mcg (50,000 unit) capsule Take by mouth 1 (one) time per week.      copper (Paragard) 380 square mm IUD 1 each by intrauterine route.      docusate sodium (Colace) 100 mg capsule Take 2 capsules (200 mg) by mouth once daily at bedtime.      famotidine (Pepcid) 20 mg tablet Take 1 tablet (20 mg) by mouth 2 times a day.      ibuprofen 600 mg tablet Take 1 tablet (600 mg) by mouth every 6 hours if needed.      ketoconazole (NIZOral) 2 % shampoo APPLY TOPICALLY 2 TIMES A WEEK. SHAMPOO DAILY, LEAVE ON FOR 5-10 MINUTES, THEN RINSE. 120 mL 3    multivitamin tablet Take 1 tablet by mouth once daily.      omeprazole (PriLOSEC) 40 mg DR capsule Take 1 capsule (40 mg) by mouth 2 times a day.      pantoprazole (ProtoNix) 40 mg EC tablet Take 1 tablet (40 mg) by mouth once daily.      sennosides-docusate sodium (Lashanda-Colace) 8.6-50 mg tablet Take 2 tablets by mouth once daily.      simethicone (Mylicon,Gas-X) 125 mg capsule Take 1 capsule (125 mg) by mouth 4 times a day as needed.      cyanocobalamin (Vitamin B-12) 1,000 mcg tablet Take 1 tablet (1,000 mcg) by mouth once daily. as directed 90 tablet 1    escitalopram (Lexapro) 10 mg tablet Take 1 tablet (10 mg) by mouth once daily. 90 tablet 0     No current facility-administered medications for this visit.       Immunization History   Administered Date(s) Administered    Flu vaccine (IIV4), preservative free *Check age/dose* 10/26/2020    Influenza, Unspecified 10/14/2013    Influenza, injectable, quadrivalent 09/26/2022    Influenza, seasonal, injectable 10/26/2020    Moderna SARS-CoV-2 Vaccination 04/13/2022    Pfizer Purple Cap SARS-CoV-2 09/01/2021, 09/23/2021    Tdap vaccine, age 7 year and older (BOOSTRIX, ADACEL) 06/11/2013, 01/19/2023        Review of Systems     Vitals:    03/07/24 0903   BP: 118/80   Pulse: 71   Temp: 36.8 °C (98.2 °F)       Physical Exam  Vitals reviewed.   HENT:      Head: Normocephalic and atraumatic.      Right  Ear: Tympanic membrane normal.      Left Ear: Tympanic membrane normal.      Nose: Nose normal.   Eyes:      Extraocular Movements: Extraocular movements intact.      Conjunctiva/sclera: Conjunctivae normal.      Pupils: Pupils are equal, round, and reactive to light.   Cardiovascular:      Rate and Rhythm: Normal rate and regular rhythm.      Pulses: Normal pulses.   Pulmonary:      Effort: Pulmonary effort is normal.      Breath sounds: Normal breath sounds.   Abdominal:      General: There is no distension.      Palpations: Abdomen is soft.      Tenderness: There is no abdominal tenderness.   Musculoskeletal:         General: Normal range of motion.      Cervical back: Normal range of motion and neck supple.      Right lower leg: No edema.      Left lower leg: No edema.   Lymphadenopathy:      Cervical: No cervical adenopathy.   Neurological:      General: No focal deficit present.      Mental Status: She is alert.          @labresults@    Assessment/Plan     Problem List Items Addressed This Visit       Depression with anxiety - Primary    Current Assessment & Plan     Symptoms improving but not controlled.  Will increase Lexapro to 10 mg daily.  Follow-up in 1 to 2 months to reevaluate or sooner with any concerns         Relevant Medications    escitalopram (Lexapro) 10 mg tablet    Vitamin B12 deficiency    Current Assessment & Plan     Continue with B12 supplement.  Recheck B12 level.         Relevant Medications    cyanocobalamin (Vitamin B-12) 1,000 mcg tablet    Class 1 obesity due to excess calories with serious comorbidity and body mass index (BMI) of 31.0 to 31.9 in adult

## 2024-05-09 ENCOUNTER — TELEPHONE (OUTPATIENT)
Dept: PRIMARY CARE | Facility: CLINIC | Age: 39
End: 2024-05-09
Payer: COMMERCIAL

## 2024-05-09 NOTE — TELEPHONE ENCOUNTER
Pt left vm stating she was seen at [facility] and diagnosed with strep, she said she is also having a migraine, stiffness down her neck and back. She said the [facility] clinic does not have a lab and they recommended her contact PCP for a lab order to rule out meningitis. Is this something you can order or should pt go to ER>

## 2024-05-10 ENCOUNTER — TELEPHONE (OUTPATIENT)
Dept: PRIMARY CARE | Facility: CLINIC | Age: 39
End: 2024-05-10
Payer: COMMERCIAL

## 2024-05-10 DIAGNOSIS — R82.90 ABNORMAL URINALYSIS: Primary | ICD-10-CM

## 2024-05-10 NOTE — TELEPHONE ENCOUNTER
Patient left voice message indicating that she did go to ER yesterday, as instructed.  She would like to know if you can take a look at the UA done as some of the levels are flagged high on her my chart and it was not mentioned to her at the time of her visit. (She went to a [facility] facility,the summary is in her chart.)

## 2024-05-11 ENCOUNTER — LAB (OUTPATIENT)
Dept: LAB | Facility: LAB | Age: 39
End: 2024-05-11
Payer: COMMERCIAL

## 2024-05-11 DIAGNOSIS — R82.90 ABNORMAL URINALYSIS: ICD-10-CM

## 2024-05-11 DIAGNOSIS — R53.83 FATIGUE, UNSPECIFIED TYPE: ICD-10-CM

## 2024-05-11 LAB
ALBUMIN SERPL BCP-MCNC: 4.1 G/DL (ref 3.4–5)
ALP SERPL-CCNC: 110 U/L (ref 33–110)
ALT SERPL W P-5'-P-CCNC: 30 U/L (ref 7–45)
ANION GAP SERPL CALC-SCNC: 13 MMOL/L (ref 10–20)
APPEARANCE UR: CLEAR
AST SERPL W P-5'-P-CCNC: 19 U/L (ref 9–39)
BASOPHILS # BLD AUTO: 0.05 X10*3/UL (ref 0–0.1)
BASOPHILS NFR BLD AUTO: 0.9 %
BILIRUB SERPL-MCNC: 0.3 MG/DL (ref 0–1.2)
BILIRUB UR STRIP.AUTO-MCNC: NEGATIVE MG/DL
BUN SERPL-MCNC: 13 MG/DL (ref 6–23)
CALCIUM SERPL-MCNC: 8.6 MG/DL (ref 8.6–10.3)
CHLORIDE SERPL-SCNC: 101 MMOL/L (ref 98–107)
CO2 SERPL-SCNC: 28 MMOL/L (ref 21–32)
COLOR UR: ABNORMAL
CREAT SERPL-MCNC: 0.73 MG/DL (ref 0.5–1.05)
EGFRCR SERPLBLD CKD-EPI 2021: >90 ML/MIN/1.73M*2
EOSINOPHIL # BLD AUTO: 0.09 X10*3/UL (ref 0–0.7)
EOSINOPHIL NFR BLD AUTO: 1.7 %
ERYTHROCYTE [DISTWIDTH] IN BLOOD BY AUTOMATED COUNT: 12.4 % (ref 11.5–14.5)
GLUCOSE SERPL-MCNC: 75 MG/DL (ref 74–99)
GLUCOSE UR STRIP.AUTO-MCNC: NEGATIVE MG/DL
HCT VFR BLD AUTO: 41.7 % (ref 36–46)
HGB BLD-MCNC: 13.8 G/DL (ref 12–16)
HOLD SPECIMEN: NORMAL
IMM GRANULOCYTES # BLD AUTO: 0.02 X10*3/UL (ref 0–0.7)
IMM GRANULOCYTES NFR BLD AUTO: 0.4 % (ref 0–0.9)
KETONES UR STRIP.AUTO-MCNC: NEGATIVE MG/DL
LEUKOCYTE ESTERASE UR QL STRIP.AUTO: NEGATIVE
LYMPHOCYTES # BLD AUTO: 1.41 X10*3/UL (ref 1.2–4.8)
LYMPHOCYTES NFR BLD AUTO: 25.9 %
MCH RBC QN AUTO: 31.7 PG (ref 26–34)
MCHC RBC AUTO-ENTMCNC: 33.1 G/DL (ref 32–36)
MCV RBC AUTO: 96 FL (ref 80–100)
MONOCYTES # BLD AUTO: 0.69 X10*3/UL (ref 0.1–1)
MONOCYTES NFR BLD AUTO: 12.7 %
NEUTROPHILS # BLD AUTO: 3.18 X10*3/UL (ref 1.2–7.7)
NEUTROPHILS NFR BLD AUTO: 58.4 %
NITRITE UR QL STRIP.AUTO: NEGATIVE
NRBC BLD-RTO: 0 /100 WBCS (ref 0–0)
PH UR STRIP.AUTO: 5 [PH]
PLATELET # BLD AUTO: 234 X10*3/UL (ref 150–450)
POTASSIUM SERPL-SCNC: 4.6 MMOL/L (ref 3.5–5.3)
PROT SERPL-MCNC: 6.6 G/DL (ref 6.4–8.2)
PROT UR STRIP.AUTO-MCNC: NEGATIVE MG/DL
RBC # BLD AUTO: 4.36 X10*6/UL (ref 4–5.2)
RBC # UR STRIP.AUTO: ABNORMAL /UL
RBC #/AREA URNS AUTO: NORMAL /HPF
SODIUM SERPL-SCNC: 137 MMOL/L (ref 136–145)
SP GR UR STRIP.AUTO: 1.01
SQUAMOUS #/AREA URNS AUTO: NORMAL /HPF
TSH SERPL-ACNC: 3.89 MIU/L (ref 0.44–3.98)
UROBILINOGEN UR STRIP.AUTO-MCNC: <2 MG/DL
VIT B12 SERPL-MCNC: 219 PG/ML (ref 211–911)
WBC # BLD AUTO: 5.4 X10*3/UL (ref 4.4–11.3)
WBC #/AREA URNS AUTO: NORMAL /HPF

## 2024-05-11 PROCEDURE — 84443 ASSAY THYROID STIM HORMONE: CPT

## 2024-05-11 PROCEDURE — 81001 URINALYSIS AUTO W/SCOPE: CPT

## 2024-05-11 PROCEDURE — 36415 COLL VENOUS BLD VENIPUNCTURE: CPT

## 2024-05-11 PROCEDURE — 80053 COMPREHEN METABOLIC PANEL: CPT

## 2024-05-11 PROCEDURE — 82607 VITAMIN B-12: CPT

## 2024-05-11 PROCEDURE — 85025 COMPLETE CBC W/AUTO DIFF WBC: CPT

## 2024-05-11 PROCEDURE — 87086 URINE CULTURE/COLONY COUNT: CPT

## 2024-05-13 LAB — BACTERIA UR CULT: NO GROWTH

## 2024-06-12 ENCOUNTER — APPOINTMENT (OUTPATIENT)
Dept: DERMATOLOGY | Facility: CLINIC | Age: 39
End: 2024-06-12
Payer: COMMERCIAL

## 2024-06-23 DIAGNOSIS — F41.8 DEPRESSION WITH ANXIETY: ICD-10-CM

## 2024-06-25 RX ORDER — ESCITALOPRAM OXALATE 10 MG/1
10 TABLET ORAL DAILY
Qty: 90 TABLET | Refills: 1 | Status: SHIPPED | OUTPATIENT
Start: 2024-06-25

## 2024-06-26 ENCOUNTER — APPOINTMENT (OUTPATIENT)
Dept: DERMATOLOGY | Facility: CLINIC | Age: 39
End: 2024-06-26
Payer: COMMERCIAL

## 2024-08-19 PROBLEM — U07.1 DISEASE DUE TO SEVERE ACUTE RESPIRATORY SYNDROME CORONAVIRUS 2 (SARS-COV-2): Status: RESOLVED | Noted: 2024-08-19 | Resolved: 2024-08-19

## 2024-08-26 ENCOUNTER — APPOINTMENT (OUTPATIENT)
Dept: PRIMARY CARE | Facility: CLINIC | Age: 39
End: 2024-08-26
Payer: COMMERCIAL

## 2024-08-26 VITALS
TEMPERATURE: 97.9 F | HEIGHT: 68 IN | WEIGHT: 208.8 LBS | SYSTOLIC BLOOD PRESSURE: 138 MMHG | HEART RATE: 83 BPM | BODY MASS INDEX: 31.64 KG/M2 | DIASTOLIC BLOOD PRESSURE: 88 MMHG

## 2024-08-26 DIAGNOSIS — E66.09 CLASS 1 OBESITY DUE TO EXCESS CALORIES WITHOUT SERIOUS COMORBIDITY WITH BODY MASS INDEX (BMI) OF 31.0 TO 31.9 IN ADULT: ICD-10-CM

## 2024-08-26 DIAGNOSIS — K46.9 ABDOMINAL HERNIA WITHOUT OBSTRUCTION AND WITHOUT GANGRENE, RECURRENCE NOT SPECIFIED, UNSPECIFIED HERNIA TYPE: Primary | ICD-10-CM

## 2024-08-26 DIAGNOSIS — F41.8 DEPRESSION WITH ANXIETY: ICD-10-CM

## 2024-08-26 DIAGNOSIS — E53.8 VITAMIN B12 DEFICIENCY: ICD-10-CM

## 2024-08-26 DIAGNOSIS — Z00.00 HEALTHCARE MAINTENANCE: ICD-10-CM

## 2024-08-26 DIAGNOSIS — R03.0 ELEVATED BLOOD PRESSURE READING: ICD-10-CM

## 2024-08-26 PROCEDURE — 1036F TOBACCO NON-USER: CPT | Performed by: FAMILY MEDICINE

## 2024-08-26 PROCEDURE — 99213 OFFICE O/P EST LOW 20 MIN: CPT | Performed by: FAMILY MEDICINE

## 2024-08-26 PROCEDURE — 3008F BODY MASS INDEX DOCD: CPT | Performed by: FAMILY MEDICINE

## 2024-08-26 RX ORDER — PHENTERMINE HYDROCHLORIDE 37.5 MG/1
1 TABLET ORAL
COMMUNITY
Start: 2024-07-11

## 2024-08-26 RX ORDER — LANOLIN ALCOHOL/MO/W.PET/CERES
1000 CREAM (GRAM) TOPICAL DAILY
Qty: 90 TABLET | Refills: 1 | Status: SHIPPED | OUTPATIENT
Start: 2024-08-26 | End: 2025-02-22

## 2024-08-26 ASSESSMENT — PATIENT HEALTH QUESTIONNAIRE - PHQ9
2. FEELING DOWN, DEPRESSED OR HOPELESS: NOT AT ALL
SUM OF ALL RESPONSES TO PHQ9 QUESTIONS 1 AND 2: 0
1. LITTLE INTEREST OR PLEASURE IN DOING THINGS: NOT AT ALL

## 2024-08-26 NOTE — ASSESSMENT & PLAN NOTE
Possible hernia noted to the left of the umbilicus.  Patient to see general surgery for further evaluation.

## 2024-08-26 NOTE — PROGRESS NOTES
Subjective   Patient ID: Tiffanie Pérez is a 39 y.o. female who presents for Follow-up (Evaluation for possible hernia).  Patient presents today for follow-up on her chronic medical problems.  She is concerned that she may have a hernia.  She states that she has had several laparoscopic surgeries and knows that that can be a cause.  She states on the left side of her abdomen when she strains or sometimes after eating notices some pain.  She has not had any nausea or vomiting.  No diarrhea or constipation or blood in her stools.  She denies any chest pain or shortness of breath or abdominal pain.  She states that mood has been good.  She has been taking Adipex for weight loss.  She would like to have some blood work rechecked.  She denies any other concerns.  HPI  Social History     Socioeconomic History    Marital status: Single     Spouse name: Not on file    Number of children: Not on file    Years of education: Not on file    Highest education level: Not on file   Occupational History    Not on file   Tobacco Use    Smoking status: Never    Smokeless tobacco: Never   Substance and Sexual Activity    Alcohol use: Yes     Comment: once weekly    Drug use: Not Currently    Sexual activity: Not on file   Other Topics Concern    Not on file   Social History Narrative    Not on file     Social Determinants of Health     Financial Resource Strain: Not on File (8/24/2019)    Received from [facility]    Financial Resource Strain     Financial Resource Strain: 0   Food Insecurity: Not on File (8/24/2019)    Received from [facility]    Food Insecurity     Food: 0   Transportation Needs: Not on File (8/24/2019)    Received from [facility]    Transportation Needs     Transportation: 0   Physical Activity: Not on File (8/24/2019)    Received from [facility]    Physical Activity     Physical Activity: 0   Stress: Not on File (8/24/2019)    Received from [facility]    Stress     Stress: 0   Social Connections: Not  on File (2019)    Received from [facility]    Social Connections     Social Connections and Isolation: 0   Intimate Partner Violence: Not on file   Housing Stability: Not on File (2019)    Received from [facility]    Housing Stability     Housin     Current Outpatient Medications   Medication Sig Dispense Refill    cholecalciferol (Vitamin D-3) 1,250 mcg (50,000 unit) capsule Take by mouth 1 (one) time per week.      copper (Paragard) 380 square mm IUD 1 each by intrauterine route.      cyanocobalamin (Vitamin B-12) 1,000 mcg tablet Take 1 tablet (1,000 mcg) by mouth once daily. as directed 90 tablet 1    ketoconazole (NIZOral) 2 % shampoo APPLY TOPICALLY 2 TIMES A WEEK. SHAMPOO DAILY, LEAVE ON FOR 5-10 MINUTES, THEN RINSE. 120 mL 3    multivitamin tablet Take 1 tablet by mouth once daily.      phentermine (Adipex-P) 37.5 mg tablet Take 1 tablet (37.5 mg) by mouth early in the morning..      cyanocobalamin (Vitamin B-12) 1,000 mcg tablet Take 1 tablet (1,000 mcg) by mouth once daily. 90 tablet 1     No current facility-administered medications for this visit.     Family History   Problem Relation Name Age of Onset    Mental illness Mother      Multiple sclerosis Mother      Cirrhosis Father      Pancreatic cancer Paternal Grandmother       Review of Systems  Immunization History   Administered Date(s) Administered    Flu vaccine (IIV4), preservative free *Check age/dose* 10/26/2020    Influenza, Unspecified 10/14/2013    Influenza, injectable, quadrivalent 2022    Influenza, seasonal, injectable 10/26/2020    Moderna SARS-CoV-2 Vaccination 2022    Pfizer Purple Cap SARS-CoV-2 2021, 2021    Tdap vaccine, age 7 year and older (BOOSTRIX, ADACEL) 2013, 2023       Review of Systems negative except as noted in HPI and Chief complaint.     Objective                 BP (!) 136/91 (BP Location: Right arm, Patient Position: Sitting)   Pulse 83   Temp 36.6 °C (97.9 °F)    "Ht 1.727 m (5' 8\")   Wt 94.7 kg (208 lb 12.8 oz)   BMI 31.75 kg/m²    Physical Exam  Vitals reviewed.   HENT:      Head: Normocephalic and atraumatic.      Right Ear: Tympanic membrane normal.      Left Ear: Tympanic membrane normal.      Nose: Nose normal.      Mouth/Throat:      Pharynx: Oropharynx is clear.   Eyes:      Extraocular Movements: Extraocular movements intact.      Conjunctiva/sclera: Conjunctivae normal.      Pupils: Pupils are equal, round, and reactive to light.   Cardiovascular:      Rate and Rhythm: Normal rate and regular rhythm.      Pulses: Normal pulses.   Pulmonary:      Effort: Pulmonary effort is normal.      Breath sounds: Normal breath sounds.   Abdominal:      General: There is no distension.      Palpations: Abdomen is soft.      Tenderness: There is no abdominal tenderness.   Musculoskeletal:         General: Normal range of motion.      Cervical back: Normal range of motion and neck supple.      Right lower leg: No edema.      Left lower leg: No edema.   Lymphadenopathy:      Cervical: No cervical adenopathy.   Neurological:      General: No focal deficit present.      Mental Status: She is alert.     Possible hernia noted to the left of the umbilicus.  No results found for this or any previous visit (from the past 96 hour(s)).    Assessment/Plan   Problem List Items Addressed This Visit       Depression with anxiety - Primary    Vitamin B12 deficiency    Relevant Medications    cyanocobalamin (Vitamin B-12) 1,000 mcg tablet    Other Relevant Orders    Comprehensive Metabolic Panel    CBC and Auto Differential    Vitamin B12    Class 1 obesity due to excess calories without serious comorbidity with body mass index (BMI) of 31.0 to 31.9 in adult     Other Visit Diagnoses       Abdominal hernia without obstruction and without gangrene, recurrence not specified, unspecified hernia type        Relevant Orders    Referral to General Surgery    Healthcare maintenance        Relevant " Orders    Lipid Panel

## 2024-08-26 NOTE — ASSESSMENT & PLAN NOTE
Patient's blood pressure is elevated today.  We did discuss that this could be related to the Adipex.  She is going to monitor at home and will follow-up if it remains elevated.

## 2024-10-03 ENCOUNTER — APPOINTMENT (OUTPATIENT)
Dept: SURGERY | Facility: CLINIC | Age: 39
End: 2024-10-03
Payer: COMMERCIAL

## 2024-10-03 VITALS
SYSTOLIC BLOOD PRESSURE: 143 MMHG | RESPIRATION RATE: 16 BRPM | BODY MASS INDEX: 31.61 KG/M2 | OXYGEN SATURATION: 98 % | DIASTOLIC BLOOD PRESSURE: 89 MMHG | WEIGHT: 208.6 LBS | TEMPERATURE: 98 F | HEART RATE: 80 BPM | HEIGHT: 68 IN

## 2024-10-03 DIAGNOSIS — K46.9 ABDOMINAL HERNIA WITHOUT OBSTRUCTION AND WITHOUT GANGRENE, RECURRENCE NOT SPECIFIED, UNSPECIFIED HERNIA TYPE: ICD-10-CM

## 2024-10-03 PROCEDURE — 99204 OFFICE O/P NEW MOD 45 MIN: CPT | Performed by: SURGERY

## 2024-10-03 PROCEDURE — 3008F BODY MASS INDEX DOCD: CPT | Performed by: SURGERY

## 2024-10-03 NOTE — PROGRESS NOTES
"History and Physical        Referring Provider: Dr. Elizabeth Quintero    Chief Complaint:  Possible incisional hernia     History of Present Illness:  This is a 39-year-old female who presents with vague abdominal pain and a possible left-sided bulge on Valsalva.  Patient had appendectomy and a cholecystectomy both laparoscopically performed in the past and thinks she may have a port site hernia.  Patient has no obstructive symptoms.     Past Medical History:  Anxiety, GERD, migraines, shingles, obesity     Past Surgical History:  Laparoscopic appendectomy, D&C, laparoscopic cholecystectomy, vaginoscopy     Medications:  As per medical records     Allergies:  No Known Drug Allergies        Family History:  The information was reviewed and no pertinent findings are relevant to the presenting problem.        Social History:  Patient is a  for [employer], she lives with her  and 2 kids, denies smoking, occasionally drinks EtOH, denies IDU.        Review of Systems  A complete 10 point review of systems was performed and is negative except as noted in the history of present illness.          Physical Exam:   /89 (BP Location: Left arm, Patient Position: Sitting, BP Cuff Size: Large adult)   Pulse 80   Temp 36.7 °C (98 °F) (Temporal)   Resp 16   Ht 1.727 m (5' 8\")   Wt 94.6 kg (208 lb 9.6 oz)   SpO2 98%   BMI 31.72 kg/m²     General: No acute distress.  Neuro: Alert and oriented ×3. Follows commands.  Head: Atraumatic  Eyes: Pupils equal reactive to light. Extraocular motions intact.  Ears: Hears normal speaking voice.  Mouth, Nose, Throat: Mucous membranes moist.  Normal dentition.  Neck: Supple. No appreciable masses.  Breast: Not examined.  Chest: Nonlabored breathing, bilateral chest rise.  Heart: Palpable regular rate and rhythm.  Vascular: Palpable radial pulses bilaterally.  Abdomen: Soft. Nondistended. Nontender.  Well-healed port sites.  No bulge/fascial defect palpated on Valsalva.  Rectal: " Not examined.  Genitourinary: Not examined.  Musculoskeletal: Moves all extremities.  Normal range of motion.  Lymphatic: No palpable lymph nodes.  Skin: No rashes or lesions.  Psychological: Normal affect        Labs:  Latest labs from 5/11/2024 are all within normal limits     Imaging: I have personally reviewed the images and the radiologist's report.  CT from 12/10/2021 shows no fascial defects, no abdominal wall hernias.        Assessment:  This is a 39-year-old female presenting with abdominal wall discomfort wanting to know if she has an incisional hernia.  I am not able to palpate her fascia and explained to her that her latest CT scan performed 3 years ago does not show any hernias, and this was after laparoscopic surgeries were performed, however since patient is having these new symptoms, we will obtain another CT scan of the abdomen and pelvis with IV contrast.    Plan:  -- I will call patient with results after CT is performed.        Amisha Paredes MD MPH  General Surgery  Office: [phone]  Fax: [phone]

## 2024-10-14 ENCOUNTER — HOSPITAL ENCOUNTER (OUTPATIENT)
Dept: RADIOLOGY | Facility: HOSPITAL | Age: 39
Discharge: HOME | End: 2024-10-14
Payer: COMMERCIAL

## 2024-10-14 DIAGNOSIS — K46.9 ABDOMINAL HERNIA WITHOUT OBSTRUCTION AND WITHOUT GANGRENE, RECURRENCE NOT SPECIFIED, UNSPECIFIED HERNIA TYPE: ICD-10-CM

## 2024-10-14 PROCEDURE — 74177 CT ABD & PELVIS W/CONTRAST: CPT

## 2024-10-14 PROCEDURE — 2550000001 HC RX 255 CONTRASTS: Performed by: SURGERY

## 2024-10-14 PROCEDURE — 74177 CT ABD & PELVIS W/CONTRAST: CPT | Performed by: RADIOLOGY

## 2024-10-17 ENCOUNTER — PATIENT MESSAGE (OUTPATIENT)
Dept: PRIMARY CARE | Facility: CLINIC | Age: 39
End: 2024-10-17
Payer: COMMERCIAL

## 2024-10-17 ENCOUNTER — DOCUMENTATION (OUTPATIENT)
Dept: SURGERY | Facility: CLINIC | Age: 39
End: 2024-10-17
Payer: COMMERCIAL

## 2024-10-17 NOTE — PROGRESS NOTES
Discussed CT scan results with pt per Dr. Paredes.  Pt will call back to schedule umbilical hernia repair after she talks to her employer about when would be a good time for her to take time off for surgery and recovery.

## 2024-10-18 DIAGNOSIS — R39.14 FEELING OF INCOMPLETE BLADDER EMPTYING: Primary | ICD-10-CM

## 2025-02-01 ENCOUNTER — PATIENT MESSAGE (OUTPATIENT)
Dept: PRIMARY CARE | Facility: CLINIC | Age: 40
End: 2025-02-01
Payer: COMMERCIAL

## 2025-04-01 DIAGNOSIS — E53.8 VITAMIN B12 DEFICIENCY: ICD-10-CM

## 2025-04-08 ENCOUNTER — APPOINTMENT (OUTPATIENT)
Dept: PRIMARY CARE | Facility: CLINIC | Age: 40
End: 2025-04-08
Payer: COMMERCIAL